# Patient Record
Sex: FEMALE | Race: WHITE
[De-identification: names, ages, dates, MRNs, and addresses within clinical notes are randomized per-mention and may not be internally consistent; named-entity substitution may affect disease eponyms.]

---

## 2023-08-16 ENCOUNTER — HOSPITAL ENCOUNTER (EMERGENCY)
Dept: HOSPITAL 97 - ER | Age: 33
LOS: 1 days | Discharge: HOME | End: 2023-08-17
Payer: SELF-PAY

## 2023-08-16 DIAGNOSIS — F43.0: Primary | ICD-10-CM

## 2023-08-16 LAB
ALBUMIN SERPL BCP-MCNC: 4.5 G/DL (ref 3.4–5)
ALP SERPL-CCNC: 66 U/L (ref 45–117)
ALT SERPL W P-5'-P-CCNC: 16 U/L (ref 13–56)
AST SERPL W P-5'-P-CCNC: 12 U/L (ref 15–37)
BILIRUB INDIRECT SERPL-MCNC: (no result) MG/DL (ref 0.2–0.8)
BUN BLD-MCNC: 17 MG/DL (ref 7–18)
GLUCOSE SERPLBLD-MCNC: 103 MG/DL (ref 74–106)
HCT VFR BLD CALC: 35.1 % (ref 36–45)
INR BLD: 1.03
LYMPHOCYTES # SPEC AUTO: 2.6 K/UL (ref 0.7–4.9)
MCV RBC: 91 FL (ref 80–100)
METHAMPHET UR QL SCN: NEGATIVE
PMV BLD: 6.4 FL (ref 7.6–11.3)
POTASSIUM SERPL-SCNC: 3.5 MEQ/L (ref 3.5–5.1)
RBC # BLD: 3.85 M/UL (ref 3.86–4.86)
THC SERPL-MCNC: NEGATIVE NG/ML
WBC # BLD AUTO: 7.4 THOU/UL (ref 4.3–10.9)

## 2023-08-16 PROCEDURE — 80179 DRUG ASSAY SALICYLATE: CPT

## 2023-08-16 PROCEDURE — 85610 PROTHROMBIN TIME: CPT

## 2023-08-16 PROCEDURE — 99283 EMERGENCY DEPT VISIT LOW MDM: CPT

## 2023-08-16 PROCEDURE — 80048 BASIC METABOLIC PNL TOTAL CA: CPT

## 2023-08-16 PROCEDURE — 81025 URINE PREGNANCY TEST: CPT

## 2023-08-16 PROCEDURE — 36415 COLL VENOUS BLD VENIPUNCTURE: CPT

## 2023-08-16 PROCEDURE — 93005 ELECTROCARDIOGRAM TRACING: CPT

## 2023-08-16 PROCEDURE — 80143 DRUG ASSAY ACETAMINOPHEN: CPT

## 2023-08-16 PROCEDURE — 82077 ASSAY SPEC XCP UR&BREATH IA: CPT

## 2023-08-16 PROCEDURE — 80076 HEPATIC FUNCTION PANEL: CPT

## 2023-08-16 PROCEDURE — 85730 THROMBOPLASTIN TIME PARTIAL: CPT

## 2023-08-16 PROCEDURE — 85025 COMPLETE CBC W/AUTO DIFF WBC: CPT

## 2023-08-16 PROCEDURE — 80307 DRUG TEST PRSMV CHEM ANLYZR: CPT

## 2023-08-16 PROCEDURE — 81003 URINALYSIS AUTO W/O SCOPE: CPT

## 2023-08-17 VITALS — TEMPERATURE: 99 F

## 2023-08-17 VITALS — DIASTOLIC BLOOD PRESSURE: 69 MMHG | SYSTOLIC BLOOD PRESSURE: 113 MMHG

## 2023-08-17 VITALS — OXYGEN SATURATION: 100 %

## 2023-08-17 NOTE — ER
Nurse's Notes                                                                                     

 [facility]                                                                 

Name: Domitila Hamilton                                                                               

Age: 32 yrs                                                                                       

Sex: Female                                                                                       

: 1990                                                                                   

MRN: [id number]                                                                                   

Arrival Date: 2023                                                                          

Time: 15:53                                                                                       

Account#: [id number]                                                                            

Bed 12                                                                                            

Private MD:                                                                                       

Diagnosis: Anxiety disorder, unspecified;Acute stress reaction                                    

                                                                                                  

Presentation:                                                                                     

                                                                                             

16:36 Chief complaint: Patient states: Panic attack X3 days. When asked if suicidal, pt       cm10

      states, "I don't think that I should be alone.". Coronavirus screen: Vaccine status:        

      Patient reports receiving the 2nd dose of the covid vaccine. Ebola Screen: Patient          

      denies travel to an Ebola-affected area in the 21 days before illness onset. No             

      symptoms or risks identified at this time. Initial Sepsis Screen: Does the patient meet     

      any 2 criteria? No. Patient's initial sepsis screen is negative. Does the patient have      

      a suspected source of infection? No. Patient's initial sepsis screen is negative. Risk      

      Assessment: Do you want to hurt yourself or someone else? Other: Pt states, "I don't        

      think that I should be alone.". Onset of symptoms was 2023.                      

16:36 Method Of Arrival: Ambulatory                                                           cm10

16:36 Acuity: INGRID 2                                                                           cm10

                                                                                                  

Triage Assessment:                                                                                

21:28 General: Appears in no apparent distress. Behavior is anxious. Pain: Denies pain.       kd3 

                                                                                                  

Historical:                                                                                       

- Allergies:                                                                                      

16:38 No Known Allergies;                                                                     cm10

- Home Meds:                                                                                      

16:38 None [Active];                                                                          cm10

- PMHx:                                                                                           

16:38 Anxiety; Depressive disorder;                                                           cm10

- PSHx:                                                                                           

16:38 None;                                                                                   cm10

                                                                                                  

- Immunization history:: Adult Immunizations unknown.                                             

- Social history:: Smoking status: Patient denies any tobacco usage or history of.                

                                                                                                  

                                                                                                  

Screenin:27 [facility] ED Fall Risk Assessment (Adult) History of falling in the last 3 months,       kd3 

      including since admission No falls in past 3 months (0 pts) Confusion or Disorientation     

      No (0 pts) Intoxicated or Sedated No (0 pts) Impaired Gait No (0 pts) Mobility Assist       

      Device Used No (0 pt) Altered Elimination No (0 pt) Score/Fall Risk Level 0 - 2 = Low       

      Risk Maintained a safe environment. Abuse screen: Denies threats or abuse. Denies           

      injuries from another. Nutritional screening: No deficits noted. Tuberculosis               

      screening: No symptoms or risk factors identified.                                          

                                                                                                  

Assessment:                                                                                       

21:28 General: Pt states that she is not having thoughts of harming herself or taking her own kd3 

      life. Pt reports that the stress is getting her and she is feeling too anxious. .           

      Neuro: Level of Consciousness is awake, alert, obeys commands, Oriented to person,          

      place, time, situation. Cardiovascular: Patient's skin is warm and dry. Respiratory:        

      Airway is patent Trachea midline Respiratory effort is even, unlabored, Respiratory         

      pattern is regular, symmetrical.                                                            

21:38 Reassessment: Spoke with Musa at [facility] waiting on a screener to call back.     vc1 

22:33 General: Pt still reporting anxious feeling, not improving with medications. . Neuro:   kd3 

      Level of Consciousness is awake, alert, obeys commands, Oriented to person, place,          

      time, situation. Cardiovascular: Patient's skin is warm and dry. Respiratory: Airway is     

      patent Trachea midline Respiratory effort is even, unlabored, Respiratory pattern is        

      regular, symmetrical.                                                                       

                                                                                                  

Vital Signs:                                                                                      

16:36  / 84; Pulse 106; Resp 18; Temp 98.2; Pulse Ox 100% ; Weight 53.52 kg; Height 5   cm10

      ft. 4 in. ;                                                                                 

21:27  / 85; Pulse 91; Resp 18; Temp 98.5(O); Pulse Ox 100% on R/A;                     kd3 

22:33  / 83; Pulse 86; Resp 18; Temp 99(TE); Pulse Ox 100% ;                            kd3 

23:36  / 69; Pulse 81; Resp 16; Pulse Ox 100% on R/A;                                   kd3 

16:36 Body Mass Index 20.25 (53.52 kg, 162.56 cm)                                             cm10

                                                                                                  

ED Course:                                                                                        

15:55 Patient arrived in ED.                                                                  rg4 

15:55 [facility]                                                        kb  

15:55 Mamadou Anne MD is Attending Physician.                                             kb  

16:38 Triage completed.                                                                       cm10

16:38 Arm band placed on.                                                                     cm10

20:26 Callie Ibarra, JOSH is Primary Nurse.                                                    kd3 

20:54 Acetaminophen Sent.                                                                     bc6 

20:54 Basic Metabolic Panel Sent.                                                             bc6 

20:54 CBC with Diff Sent.                                                                     bc6 

20:54 ETOH Level Sent.                                                                        bc6 

20:54 Hepatic Function Sent.                                                                  bc6 

20:54 PT-INR Sent.                                                                            bc6 

20:54 Pregnancy Test, Urine Sent.                                                             bc6 

20:54 Ptt, Activated Sent.                                                                    bc6 

20:54 Salicylate Sent.                                                                        bc6 

20:54 Urinalysis w/ reflexes Sent.                                                            bc6 

20:54 Urine Drug Screen Sent.                                                                 bc6 

20:54 Inserted saline lock: 20 gauge in right antecubital area, using aseptic technique.      bc6 

      Blood collected.                                                                            

21:28 Patient has correct armband on for positive identification. Provided Education on: .    kd3 

                                                                                                  

Administered Medications:                                                                         

16:46 Drug: LORazepam PO 1 mg Route: PO;                                                      cm10

21:50 Drug: hydrOXYzine PO 25 mg Route: PO;                                                   kd3 

                                                                                                  

                                                                                                  

Medication:                                                                                       

21:28 VIS not applicable for this client.                                                     kd3 

                                                                                                  

Outcome:                                                                                          

                                                                                             

01:12 Discharge ordered by MD.                                                                kb  

01:28 Patient left the ED.                                                                    kl  

                                                                                                  

Signatures:                                                                                       

Jill Faith, FNP-C                 FNP-Philippb                                                   

Shalini Levy, RN                     RN   Lou Trinh4                                                  

Callie Ibarra RN RN   kd3                                                  

Irena Faith RN RN   vc1                                                  

Elsy Del Toro                           bc6                                                  

Kallie Maya RN                  RN   cm10                                                 

                                                                                                  

Corrections: (The following items were deleted from the chart)                                    

                                                                                             

16:25 16:23 Allergies: No Known Allergies; cm10                                               cm10

16:25 16:23 Home Meds: None; cm10                                                             cm10

16:25 16:23 PMHx: None; cm10                                                                  cm10

16:25 16:23 PSHx: None; cm10                                                                  cm10

16:25 16:23 Immunization history: Adult Immunizations unknown, cm10                           cm10

16:25 16:23 Social history: Smoking status: unknown cm10                                      cm10

                                                                                                  

**************************************************************************************************

## 2023-08-17 NOTE — EKG
Test Date:    2023-08-16               Test Time:    21:15:13

Technician:   ANTONINA                                    

                                                     

MEASUREMENT RESULTS:                                       

Intervals:                                           

Rate:         92                                     

DE:           132                                    

QRSD:         76                                     

QT:           364                                    

QTc:          450                                    

Axis:                                                

P:            75                                     

DE:           132                                    

QRS:          76                                     

T:            61                                     

                                                     

INTERPRETIVE STATEMENTS:                                       

                                                     

Normal sinus rhythm

Normal ECG

No previous ECG available for comparison



Electronically Signed On 08-17-23 17:27:25 CDT by Roberto Turpin

## 2024-09-01 ENCOUNTER — HOSPITAL ENCOUNTER (EMERGENCY)
Dept: HOSPITAL 97 - ER | Age: 34
Discharge: HOME | End: 2024-09-01
Payer: SELF-PAY

## 2024-09-01 VITALS — OXYGEN SATURATION: 98 % | TEMPERATURE: 98 F | SYSTOLIC BLOOD PRESSURE: 135 MMHG | DIASTOLIC BLOOD PRESSURE: 65 MMHG

## 2024-09-01 DIAGNOSIS — B35.9: Primary | ICD-10-CM

## 2024-09-01 PROCEDURE — 99283 EMERGENCY DEPT VISIT LOW MDM: CPT

## 2024-09-01 NOTE — XMS REPORT
Continuity of Care Document



                          Created on: 2024





JANES BABCOCK

External Reference #: [id number]

: 1990

Sex: Female



Demographics





                                        Address             [address] [place]  [address]

 

                                        Home Phone          [phone]

 

                                        Work Phone          [phone]

 

                                        Mobile Phone        [phone]

 

                                        Email Address       [email or web address]

[email or web address]

 

                                        Preferred Language  en

 

                                        Marital Status      Unknown

 

                                        Mandaen Affiliation Unknown

 

                                        Race                Unknown

 

                                        Additional Race(s)  White

 

                                        Ethnic Group        Not  or Lati

no





Author





                                        Name                Unknown

 

                                        Address             [address]

[place]  [address]

 

                                        [facility]

[employer]

 

                                        Address             [address]

[address]

[place]  [address]

 

                                        Phone               [phone]





Support





                          Name         Relationship Address      Phone

 

                          Nba Baca Father       Unknown      [phone]

 

                          Lisa Jerry  Significant Other Unknown      [phone]

 

                          JAMIE GONZALEZ  Other        Unknown      Unavailable

 

                                LISA JERRY     LP              [address] [place]  [address] [phone]





Care Team Providers





                                Care Team Member Name Role            Phone

 

                                Pcp, Patient Does Not Have A Primary Care Physic

best [phone]

 

                                BETHANY PALMA Attending Clinician Unavailable

 

                                SARAH RODRIGUEZ Attending Clinician Unavailable

 

                                SARAH Arias Attending Clinician [phone] 

 

                                ELIZABETH WINSTON Attending Clinician Unavailable

 

                                RASHID LÓPEZ    Attending Clinician Unavailable

 

                                Arian Alas DO Attending Clinician [phone]

 

                                ARIAN ALAS Attending Clinician Unavailable

 

                                BETHANY PALMA Admitting Clinician Unavailable

 

                                ELIZABETH WINSTON Admitting Clinician Unavailable







Payers





                    Payer Name Policy Type Policy Number Effective Date Expirati

on Date Source

 

                                                    [employer] [place] 

[employer]                                   [id number]           2023 

00:00:00                                            

 

                                                    [facility]                        [id number]              2023 

00:00:00                                            







Allergies, Adverse Reactions, Alerts





                                                    Allergy 

Name                                    Allergy 

Type            Status          Severity        Reaction(s)     Onset 

Date                                    Inactive 

Date                                    Treating 

Clinician                 Comments                  Source

 

                                                    NO KNOWN 

ALLERGIE

S                                       D[facility] 

[facility]   [facility]                                                  [facility]







Social History





                    Social Habit Start Date Stop Date Quantity  Comments  Source

 

                    Gender identity                                         Univ

[facility]

 

                    [facility]                                         [facility]

 

                                        Sex Assigned At Birth 1990 

00:00:1990 

00:00:00                                                    [facility]







                          Smoking Status Start Date   Stop Date    Source

 

                                                    Tobacco smoking consumption 

unknown                                                     [facility]







Medications





                                                    Ordered 

Medication 

Name                                    Filled 

Medication 

Name                                    Start 

Date                                    Stop 

Date                                    Current 

Medication?                             Ordering 

Clinician       Indication      Dosage          Frequency       Signature 

(SIG)               Comments            Components          Source

 

                                                    hydrOXYzine 

(ATARAX) 

tablet 25 

mg                                                   

20:30:

00                                       

20:23

:00        No                               25mg                  25 mg, 

Oral, 

ONCE, 1 

dose, On 

23 at 

1530, ASAP                                                  [facility]

 

                                                    hydrOXYzine 

25 mg 

tablet                                               

00:00:

00                                       

04:59

:00        No                    [id number]   25mg                  Take 1 

tablet by 

mouth 

every 6 

(six) 

hours for 

30 doses.                                                   [facility]

 

                                                    clonazePAM 

(KLONOPIN) 

tablet 1 mg                                          

22:30:

00                                       

21:49

:00        No                               1mg                   1 mg, 

Oral, ONCE 

NOW, 1 

dose, On 

23 at 

1730, 

Routine                                                     [facility]

 

                                                    hydrOXYzine 

10 mg 

tablet                                               

00:00:

00                  Yes                 [id number] 10mg                Take 1 

tablet by 

mouth 

every 6 

(six) 

hours as 

needed for 

Anxiety.                                                    [facility]







Vital Signs





                      Vital Name Observation Time Observation Value Comments   S

tommy

 

                                                    Systolic blood 

pressure        2023 19:54:00 148 mm[Hg]                      [facility]

 

                                                    Diastolic blood 

pressure        2023 19:54:00 85 mm[Hg]                       [facility]

 

                      Heart rate 2023 19:54:00 103 /min              [facility]

 

                      Body temperature 2023 19:54:00 37.11 Rosa Isela            

 [facility]

 

                      Respiratory rate 2023 19:54:00 18 /min              

 [facility]

 

                      Body height 2023 19:54:00 162.6 cm              [facility]

 

                      Body weight 2023 19:54:00 53.524 kg             [facility]

 

                      BMI        2023 19:54:00 20.25 kg/m2            [facility]

 

                                                    Oxygen saturation in 

Arterial blood by 

Pulse oximetry  2023 19:54:00 99 /min                         [facility]

 

                      Body temperature 2023 22:08:36 36.89 Rosa Isela            

 [facility]

 

                                                    Systolic blood 

pressure        2023 22:07:33 139 mm[Hg]                      [facility]

 

                                                    Diastolic blood 

pressure        2023 22:07:33 83 mm[Hg]                       [facility]

 

                      Heart rate 2023 22:07:33 99 /min               [facility]

 

                      Respiratory rate 2023 22:07:33 18 /min              

 [facility]

 

                                                    Oxygen saturation in 

Arterial blood by 

Pulse oximetry  2023 22:07:33 99 /min                         [facility]

 

                      Body height 2023 21:21:00 162.6 cm              [facility]

 

                      Body weight 2023 21:21:00 53.524 kg             [facility]

 

                      BMI        2023 21:21:00 20.25 kg/m2            [facility]







Procedures





                          Procedure    Date / Time Performed Performing Clinicia

n Source

 

                                ASSIGNMENT OF BENEFITS 2023 20:35:01 Docto

r Unassigned, No 

Name                                    [facility]

 

                                                    CONSENT/REFUSAL FOR 

DIAGNOSIS AND 

TREATMENT                 2023 19:44:35       Doctor Unassigned, No 

Name                                    [facility]

 

                                                    NOTICE OF PRIVACY 

PRACTICES                 2023 21:10:04       Doctor Unassigned, No 

Name                                    [facility]

 

                                                    CONSENT/REFUSAL FOR 

DIAGNOSIS AND 

TREATMENT                 2023 21:09:09       Doctor Unassigned, No 

Name                                    [facility]







Encounters





                                                    Start 

Date/Time                               End 

Date/Time                               Encounter 

Type                                    Admission 

Type                                    Attending 

Clinicians                              Care 

Facility                                Care 

Department                              Encounter 

ID                                      Source

 

                                                    2023 

22:28:00                                2023 

14:00:00            Outpatient                              BETHANY PALMA            [facility]          [id number]       [facility]

 

                                                    2023 

14:55:00                                2023 

15:41:00  Emergency X         SARAH RODRIGUEZ [facility]      ERT       [id number] [facility]

 

                                                    2023 

14:55:00                                2023 

15:41:00            Emergency                               SARAH Rodriguez                                   [facility]                                  [id number]

[id number]

[id number]

[id number]

084                       [id number]                 [facility]

 

                                                    2023 

17:05:00                                2023 

13:23:00            Outpatient                              ELIZABETH WINSTON          [facility]          [id number]       [facility]

 

                                                    2023 

09:22:00                                2023 

16:30:00   Emergency  RASHID VALDEZ [facility]       [id number]

                                      [facility]

 

                                                    2023 

16:23:00                                2023 

17:16:00            Emergency                               Arian Alas                                 [facility]                                  [id number]

[id number]

[id number]

[id number]

084                       [id number]                 [facility]

 

                                                    2023 

16:23:00                                2023 

17:16:00            Emergency           X                   ARIAN ALAS         [facility]            ERT             [id number]      [facility]







Notes





                          Date/Time    Note         Provider     Source

 

                                        2023 15:29:17 Formatting of this n

ote might 

be different from the original.

PT D/C home. GCS15, VS stable, 

no ataxia noted. Given one 

prescription and D/C paperwork. 

Pt ambulatory at time of 

discharge. Pt educated on 

anxiety, coping mechanisms, med 

usage, follow up care with 

therapist, s/s worsening 

condition. Pt verbalized 

understanding. Work/school note 

was not given.



Electronically signed by Dominique Brown RN at 2023 3:29 

PM CDT

                                                    [facility]

 

                                        2023 14:53:47 Formatting of this n

ote might 

be different from the original.

"I can't get my panic attacks 

under control. I just got out 

of the psych center. I have 

hydroxyzine but it doesn't 

touch it."

Electronically signed by Dominique Brown RN at 2023 2:54 

PM CDT

                          Dominique Brown RN           [facility]

 

                                        2023 16:51:18 Formatting of this n

ote might 

be different from the original.

Business office at bedside

Electronically signed by 

Josey Lima RN at 

2023 4:52 PM CDT

                          Josey Lima RN       [facility]

 

                                        2023 16:15:41 Formatting of this n

ote might 

be different from the original.

Pt c/o panic attacks over the 

last four days due to increased 

stress. States that she was 

seen at \[facility]\"" ER yesterday 

and was discharged to follow-up 

with [facility]. Went to 

[facility] today and was 

told "they couldn't do anything 

for me and that I needed to 

come to the ER." Reports being 

under a lot of stress.

Electronically signed by 

Tri Tineo RN at 

2023 4:21 PM CDT

                                                    [facility]

## 2024-09-01 NOTE — ER
Nurse's Notes                                                                                     

 [facility]                                                                 

Name: Domitila Hamilton                                                                               

Age: 33 yrs                                                                                       

Sex: Female                                                                                       

: 1990                                                                                   

MRN: [id number]                                                                                   

Arrival Date: 2024                                                                          

Time: 13:32                                                                                       

Account#: [id number]                                                                            

Bed 11                                                                                            

Private MD:                                                                                       

Diagnosis: Dermatophytosis, unspecified                                                           

                                                                                                  

Presentation:                                                                                     

                                                                                             

14:13 Chief complaint: Patient states: she has what she believes to be a spider bite on the   ap3 

      outside of her left leg. patient reports a wound that has been present for approx 3         

      days. patient reports pain as a 3/10 on the pain scale. patient states the area itches.     

      Coronavirus screen: At this time, the client does not indicate any symptoms associated      

      with coronavirus-19. Ebola Screen: No symptoms or risks identified at this time.            

      Initial Sepsis Screen: Does the patient meet any 2 criteria? No. Patient's initial          

      sepsis screen is negative. Does the patient have a suspected source of infection? No.       

      Patient's initial sepsis screen is negative. Risk Assessment: Do you want to hurt           

      yourself or someone else? Patient reports no desire to harm self or others. Onset of        

      symptoms was 2024.                                                               

14:13 Method Of Arrival: Ambulatory                                                           ap3 

14:13 Acuity: INGRID 4                                                                           ap3 

                                                                                                  

Triage Assessment:                                                                                

14:14 Bite description: bite sustained to left hip by an unknown animal, animal information:  ap3 

      vaccination(s) is not applicable. General: Appears in no apparent distress. Behavior is     

      calm, cooperative, appropriate for age. Pain: Complains of pain in left hip Pain            

      currently is 3 out of 10 on a pain scale. Neuro: Level of Consciousness is awake,           

      alert, obeys commands, Oriented to person, place, time, situation, Appropriate for age.     

      Cardiovascular: Patient's skin is warm and dry. Respiratory: Airway is patent               

      Respiratory effort is even, unlabored, Respiratory pattern is regular, symmetrical.         

                                                                                                  

OB/GYN:                                                                                           

14:16 LMP 2024, Pregnancy unknown                                                        ap3 

                                                                                                  

Historical:                                                                                       

- Allergies:                                                                                      

14:14 No Known Allergies;                                                                     ap3 

- PMHx:                                                                                           

14:14 Anxiety; depressive disorder;                                                           ap3 

                                                                                                  

- Immunization history:: Client reports receiving the 2nd dose of the Covid vaccine.              

- Infectious Disease History:: Denies.                                                            

- Social history:: Smoking status: Patient denies any tobacco usage or history of.                

                                                                                                  

                                                                                                  

Screenin:15 [facility] ED Fall Risk Assessment (Adult) History of falling in the last 3 months,       ap3 

      including since admission No falls in past 3 months (0 pts) Confusion or Disorientation     

      No (0 pts) Intoxicated or Sedated No (0 pts) Impaired Gait No (0 pts) Mobility Assist       

      Device Used No (0 pt) Altered Elimination No (0 pt) Score/Fall Risk Level 0 - 2 = Low       

      Risk Oriented to surroundings, Maintained a safe environment, Educated pt \T\ family on     

      fall prevention, incl call for assistance when getting out of bed, Assessed \T\             

      reinforced patient's understanding of fall precautions, Hourly rounding (assess needs \T\   

      fall precautionary measures) done, Used ambulatory aids as needed (educated on \T\          

      assisted with), Used gait belt as appropriate. Abuse screen: Denies threats or abuse.       

      Nutritional screening: No deficits noted. Tuberculosis screening: No symptoms or risk       

      factors identified.                                                                         

                                                                                                  

Assessment:                                                                                       

14:40 General: Appears in no apparent distress. Behavior is calm, cooperative. Pain: Denies   tl4 

      pain. Neuro: Level of Consciousness is awake, alert, obeys commands, Oriented to            

      person, place, time, situation. Cardiovascular: Capillary refill < 3 seconds Patient's      

      skin is warm and dry. Respiratory: Airway is patent Respiratory effort is even,             

      unlabored, Respiratory pattern is regular, symmetrical, Breath sounds are clear             

      bilaterally. GI: No deficits noted. No signs and/or symptoms were reported involving        

      the gastrointestinal system. : No deficits noted. No signs and/or symptoms were           

      reported regarding the genitourinary system. EENT: No deficits noted. No signs and/or       

      symptoms were reported regarding the EENT system. Derm: Skin has lesions on red,raised      

      area to left thigh.                                                                         

14:50 Derm: Skin is pink, warm \T\ dry.                                                         tl4

                                                                                                  

Vital Signs:                                                                                      

14:13  / 65; Pulse 71; Resp 17; Temp 98; Pulse Ox 98% ; Weight 58.97 kg; Height 5 ft. 4 ap3 

      in. ; Pain 3/10;                                                                            

14:13 Body Mass Index 22.31 (58.97 kg, 162.56 cm)                                             ap3 

14:13 Pain Scale: Adult                                                                       ap3 

                                                                                                  

ED Course:                                                                                        

13:35 Patient arrived in ED.                                                                  im  

13:37 Aylin Mari PA-C is PHCP.                                                            sb4 

13:37 Dain Garber MD is Attending Physician.                                                sb4 

14:14 Triage completed.                                                                       ap3 

14:15 Arm band placed on right wrist.                                                         ap3 

14:22 Julián Olivares, RN is Primary Nurse.                                                     tl4 

14:43 No provider procedures requiring assistance completed. Patient did not have IV access   tl4 

      during this emergency room visit.                                                           

14:44 Patient has correct armband on for positive identification. Bed in low position. Call   tl4 

      light in reach. Provided Education on: call bell.                                           

                                                                                                  

Administered Medications:                                                                         

No medications were administered                                                                  

                                                                                                  

                                                                                                  

Medication:                                                                                       

14:42 VIS not applicable for this client.                                                     tl4 

                                                                                                  

Outcome:                                                                                          

14:22 Discharge ordered by MD.                                                                sb4 

14:52 Discharged to home ambulatory,                                                          tl4 

14:52 Condition: stable                                                                           

14:52 Discharge instructions given to patient, Instructed on discharge instructions, follow       

      up and referral plans. medication usage, Demonstrated understanding of instructions,        

      follow-up care, medications, Prescriptions given X 1,                                       

14:53 Patient left the ED.                                                                    tl4 

                                                                                                  

Signatures:                                                                                       

Sunni Burgos RN                    RN   ap3                                                  

Aylin Mari, ARLEN                     PANeishaC sb4                                                  

Raisa Canas Toni, RN                       RN   tl4                                                  

                                                                                                  

**************************************************************************************************

## 2024-09-01 NOTE — EDPHYS
Physician Documentation                                                                           

 [facility]                                                                 

Name: Domitila Hamilton                                                                               

Age: 33 yrs                                                                                       

Sex: Female                                                                                       

: 1990                                                                                   

MRN: [id number]                                                                                   

Arrival Date: 2024                                                                          

Time: 13:32                                                                                       

Account#: [id number]                                                                            

Bed 11                                                                                            

Private MD:                                                                                       

ED Physician Dain Garber                                                                         

HPI:                                                                                              

                                                                                             

14:25 This 33 yrs old Female presents to ER via Ambulatory with complaints of Insect Bite -   sb4 

      left thigh.                                                                                 

14:31 Onset: The symptoms/episode began/occurred 3 day(s) ago. The patient has not            sb4 

      experienced similar symptoms in the past. possible insect bite left lateral thigh 3         

      days ago. says it itches. denies pain. no fever or discharge.                               

                                                                                                  

OB/GYN:                                                                                           

14:16 LMP 2024, Pregnancy unknown                                                        ap3 

                                                                                                  

Historical:                                                                                       

- Allergies:                                                                                      

14:14 No Known Allergies;                                                                     ap3 

- PMHx:                                                                                           

14:14 Anxiety; depressive disorder;                                                           ap3 

                                                                                                  

- Immunization history:: Client reports receiving the 2nd dose of the Covid vaccine.              

- Infectious Disease History:: Denies.                                                            

- Social history:: Smoking status: Patient denies any tobacco usage or history of.                

                                                                                                  

                                                                                                  

ROS:                                                                                              

14:31 Constitutional: Negative for fever, chills, and weight loss,                            sb4 

14:31 Skin: Positive for per HPI,                                                                 

14:31 All other systems are negative,                                                             

                                                                                                  

Exam:                                                                                             

14:31 Constitutional:  This is a well developed, well nourished patient who is awake, alert,  sb4 

      and in no acute distress. Head/Face:  Normocephalic, atraumatic. Eyes:  Extra-ocular        

      motions intact.  Periorbital areas with no swelling, redness, or edema. ENT:  Mucous        

      membranes moist.                                                                            

14:31 Skin: ringworm, on the lateral aspect of left thigh,                                        

                                                                                                  

Vital Signs:                                                                                      

14:13  / 65; Pulse 71; Resp 17; Temp 98; Pulse Ox 98% ; Weight 58.97 kg; Height 5 ft. 4 ap3 

      in. ; Pain 3/10;                                                                            

14:13 Body Mass Index 22.31 (58.97 kg, 162.56 cm)                                             ap3 

14:13 Pain Scale: Adult                                                                       ap3 

                                                                                                  

MDM:                                                                                              

13:43 Patient medically screened.                                                             sb4 

14:31 Data reviewed: vital signs, nurses notes, and as a result, I will discharge patient.    sb4 

      Counseling: I had a detailed discussion with the patient and/or guardian regarding the      

      historical points, exam findings, and any diagnostic results supporting the                 

      discharge/admit diagnosis, to return to the emergency department if symptoms worsen or      

      persist or if there are any questions or concerns that arise at home.                       

                                                                                                  

Administered Medications:                                                                         

No medications were administered                                                                  

                                                                                                  

                                                                                                  

Disposition Summary:                                                                              

24 14:22                                                                                    

Discharge Ordered                                                                                 

 Notes:       Location: Home                                                                        
  sb4

      Problem: new                                                                            sb4 

      Symptoms: are unchanged                                                                 sb4 

      Condition: Stable                                                                       sb4 

      Diagnosis                                                                                   

        - Dermatophytosis, unspecified                                                        sb4 

      Followup:                                                                               sb4 

        - With: Private Physician                                                                  

        - When: As needed                                                                          

        - Reason: Recheck today's complaints, Re-evaluation by your physician                      

      Discharge Instructions:                                                                     

        - Discharge Summary Sheet                                                             sb4 

        - Body Ringworm                                                                       sb4 

      Forms:                                                                                      

        - Patient Portal Instructions                                                         sb4 

        - Leadership Thank You Letter                                                         sb4 

      Prescriptions:                                                                              

        - Clotrimazole 1 % Topical Cream                                                           

            - Apply to affected area 1 application TOPICAL route every 12 hours; 15 gram;     sb4 

      Refills: 0, Product Selection Permitted                                                     

Signatures:                                                                                       

Sunni Burgos RN                    RN   ap3                                                  

Aylin Mari PA-C PA-C sb4                                                  

                                                                                                  

**************************************************************************************************